# Patient Record
(demographics unavailable — no encounter records)

---

## 2024-11-25 NOTE — DISCUSSION/SUMMARY
[PVCs] : ectopic ventricular beats [Hypertension] : hypertension [Stable] : stable [Low Sodium Diet] : low sodium diet [de-identified] : cardiac rehab [Responding to Treatment] : responding to treatment [None] : There are no changes in medication management [Minutes Spent: ___] : for [unfilled] ~Uminutes [de-identified] : f/u ep visit, address anticoag need [de-identified] : s/p cabg [de-identified] : pt admits recent hi salt [de-identified] : consider arb if remains hi [de-identified] : s/p MVR [de-identified] : eliquis bid per surgery, abx prophylaxis for dental work [FreeTextEntry2] : reviewed prior tests, discussed many issues

## 2024-11-25 NOTE — REASON FOR VISIT
[FreeTextEntry1] : Patient with positive cardiac risk factors for CAD presents for f/u evaluation. pt went to [facility] 12/30, had syncope, had MI, then tx to [facility], had cabg/MVR/ DELORES clip with Dr Fred cage, then [facility], then went back to hosp for thoracentesis, now home with visiting PT/ RN. pt feels better now, doing cardiac rehab, no cardiac sx, has occas pvcs on rehab tele pt anxious now about holidays, no cardiac sx

## 2024-11-25 NOTE — REASON FOR VISIT
[FreeTextEntry1] : Patient with positive cardiac risk factors for CAD presents for f/u evaluation. pt went to [facility] 12/30, had syncope, had MI, then tx to [facility], had cabg/MVR/ DELORES clip with Dr Fred cage, then [facility], then went back to hosp for thoracentesis, now home with visiting PT/ RN. pt feels better now, doing cardiac rehab, no cardiac sx, has occas pvcs on rehab tele pt anxious now about holidays, no cardiac sx

## 2024-11-25 NOTE — DISCUSSION/SUMMARY
[PVCs] : ectopic ventricular beats [Hypertension] : hypertension [Stable] : stable [Low Sodium Diet] : low sodium diet [de-identified] : cardiac rehab [Responding to Treatment] : responding to treatment [None] : There are no changes in medication management [Minutes Spent: ___] : for [unfilled] ~Uminutes [de-identified] : f/u ep visit, address anticoag need [de-identified] : s/p cabg [de-identified] : pt admits recent hi salt [de-identified] : consider arb if remains hi [de-identified] : s/p MVR [de-identified] : eliquis bid per surgery, abx prophylaxis for dental work [FreeTextEntry2] : reviewed prior tests, discussed many issues

## 2024-12-05 NOTE — PHYSICAL EXAM
[No JVD] : no jugular venous distention [Regular Rhythm] : with a regular rhythm [Normal S1, S2] : normal S1 and S2 [Normal] : no CVA or spinal tenderness [No Joint Swelling] : no joint swelling [Grossly Normal Strength/Tone] : grossly normal strength/tone [None] : no ulcers in either foot were found [TWNoteComboBox3] : +2 [TWNoteComboBox4] : +2

## 2024-12-05 NOTE — HISTORY OF PRESENT ILLNESS
[Coronary Artery Disease] : Coronary Artery Disease [Diabetes Mellitus] : Diabetes Mellitus [Hyperlipidemia] : Hyperlipidemia [Hypertension] : Hypertension [Other: ___] : [unfilled] [No episodes] : No hypoglycemic episodes since the last visit. [Fasting:  ___] : Fasting Blood Sugar: [unfilled] mg/dL [Most Recent A1C: ___] : Most recent A1C was [unfilled] [Does not check BP] : The patient is not checking blood pressure [120/80] : Target blood pressure is 120/80 [Stable] : Patient is stable [FreeTextEntry6] : 78y/o well controlled DM2 HTN HLD CAD f/u cardiology, h/o breast ca here for f/u medical condtions and notes is f/u getting FS and weighting self. 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  Pt notes cardiology is only responsible for heart meds and needs other pills   Blood Sugar 92, 107, 120, 111 Weight 112, 110 [EyeExamDate] : 12/4/2024

## 2024-12-05 NOTE — ASSESSMENT
[FreeTextEntry1] : Pt education done and pt took notes of recommendations given and advised to restart cream bilateral feet. A picture was taken of her feet for pt to see on her own phone as she was not able to see it. Pt's questions answered verbalizing understanding   I am providing continuous care that includes testing, discussion of treatment options and shared decision making on diagnosis chosen treatment.

## 2024-12-05 NOTE — REVIEW OF SYSTEMS
[Hearing Loss] : hearing loss [FreeTextEntry2] : forgetful [de-identified] : right foot pain after stepped on toy

## 2024-12-05 NOTE — REVIEW OF SYSTEMS
[Hearing Loss] : hearing loss [FreeTextEntry2] : forgetful [de-identified] : right foot pain after stepped on toy

## 2024-12-16 NOTE — REASON FOR VISIT
[FreeTextEntry1] : Patient with positive cardiac risk factors for CAD presents for f/u evaluation. pt went [facility], had syncope, had MI, then tx to [facility], had cabg/MVR/ DELORES clip with Dr Fred cage, then [facility], then went back to hosp for thoracentesis, now home with visiting PT/ RN. pt feels better now, doing cardiac rehab, no cardiac sx, has occas pvcs on rehab tele pt anxious now about holidays, pt claims had a brief atypical sscp a few weeks ago, not re-occured

## 2024-12-16 NOTE — REASON FOR VISIT
[FreeTextEntry1] : Patient with positive cardiac risk factors for CAD presents for f/u evaluation. [place] [facility] [place], had syncope, had MI, then tx to [facility], had cabg/MVR/ DELORES clip with Dr Fred cage, then [facility], then went back to hosp for thoracentesis, now home with visiting PT/ RN. pt feels better now, doing cardiac rehab, no cardiac sx, has occas pvcs on rehab tele pt anxious now about holidays, pt claims had a brief atypical sscp a few weeks ago, not re-occured

## 2024-12-16 NOTE — DISCUSSION/SUMMARY
[PVCs] : ectopic ventricular beats [Hypertension] : hypertension [Stable] : stable [Responding to Treatment] : responding to treatment [Low Sodium Diet] : low sodium diet [None] : There are no changes in medication management [Echocardiogram] : an echocardiogram [de-identified] : f/u ep visit, address anticoag need [de-identified] : s/p cabg [de-identified] : pt admits recent hi salt [de-identified] : consider arb if remains hi [de-identified] : s/p MVR [de-identified] : eliquis bid per surgery, abx prophylaxis for dental work

## 2024-12-16 NOTE — DISCUSSION/SUMMARY
[PVCs] : ectopic ventricular beats [Hypertension] : hypertension [Stable] : stable [Responding to Treatment] : responding to treatment [Low Sodium Diet] : low sodium diet [None] : There are no changes in medication management [Echocardiogram] : an echocardiogram [de-identified] : f/u ep visit, address anticoag need [de-identified] : s/p cabg [de-identified] : pt admits recent hi salt [de-identified] : consider arb if remains hi [de-identified] : s/p MVR [de-identified] : eliquis bid per surgery, abx prophylaxis for dental work

## 2024-12-18 NOTE — CONSULT LETTER
[Dear  ___] : Dear  [unfilled], [Courtesy Letter:] : I had the pleasure of seeing your patient, [unfilled], in my office today. [Please see my note below.] : Please see my note below. [Consult Closing:] : Thank you very much for allowing me to participate in the care of this patient.  If you have any questions, please do not hesitate to contact me. [Sincerely,] : Sincerely, [FreeTextEntry3] : Elly James MD

## 2024-12-18 NOTE — HISTORY OF PRESENT ILLNESS
[Disease: _____________________] : Disease: [unfilled] [T: ___] : T[unfilled] [N: ___] : N[unfilled] [M: ___] : M[unfilled] [AJCC Stage: ____] : AJCC Stage: [unfilled] [de-identified] : Patient with history of left breast cancer 10/2013-stage IA (pT1cN0), ER positive, [place] positive, HER-2 negative. Oncotype DX score of 35 placed patient in high risk category. Status post left breast conservation surgery--> Taxotere/Cytoxan-->RT--> Arimidex 6/2014-6/2021. Received biannual Zometa infusions (Zometa discontinued 2019).\par  BRCA 1 and 2 testing negative.\par  \par  11/2013-Leukopenia/thrombocytopenia/macrocytosis.  Bone marrow biopsy and bone marrow aspirate showed erythroid predominant trilineage hematopoiesis with maturation and increased iron stores. No ring sideroblasts seen. Megakaryocytes appeared normal in number with normal morphology. Flow cytometry myeloid immunophenotypic findings showed normal myeloid granularity, no increase in myeloid immaturity and normal myeloid antigen maturation pattern. The lymphocyte immunophenotypic findings showed no diagnostic abnormalities. Cytogenetics showed a normal karyotype.  Negative MDS FISH study.\par  \par   [de-identified] : No new complaints. Notes hearing still poor. No abnormal bruising or bleeding. No recent fever. No cough or SOB. No c/o H/A, palpitations, edema.     [de-identified] : Invasive ductal carcinoma

## 2024-12-18 NOTE — REVIEW OF SYSTEMS
[Negative] : Allergic/Immunologic [Loss of Hearing] : loss of hearing [Fainting] : no fainting [de-identified] : decreased memory

## 2024-12-18 NOTE — PHYSICAL EXAM
Problem: Physical Therapy Goal  Goal: Physical Therapy Goal  Goals to be met by: 14 days     Patient will increase functional independence with mobility by performin. Supine to sit with Stand-by Assistance  2. Sit to supine with Stand-by Assistance4  3. Sit to stand transfer with Stand-by Assistance with RLE brace locked in extension and with left platform rolling walker  4. Bed to chair transfer with Stand-by Assistance  with RLE brace locked in extension and with left platform rolling walker  5. Gait  x 150 feet with Stand-by Assistance  with RLE brace locked in extension and with left platform rolling walker  6. Wheelchair propulsion x50 feet with Stand-by Assistance using bilateral upper extremities  7. Ascend/Descend 4 inch curb step with Contact Guard Assistance  with RLE brace locked in extension and with left platform rolling walker.  8. Lower extremity exercise program x20 reps per handout, with assistance as needed and gym therex     Outcome: Ongoing (interventions implemented as appropriate)  Goals remain appropriate.        [Fully active, able to carry on all pre-disease performance without restriction] : Status 0 - Fully active, able to carry on all pre-disease performance without restriction [Normal] : affect appropriate [de-identified] : no dominant mass, nipple discharge [de-identified] : no gross focal motor extremity deficits; poor memory

## 2024-12-18 NOTE — PHYSICAL EXAM
[Fully active, able to carry on all pre-disease performance without restriction] : Status 0 - Fully active, able to carry on all pre-disease performance without restriction [Normal] : affect appropriate [de-identified] : no dominant mass, nipple discharge [de-identified] : no gross focal motor extremity deficits; poor memory

## 2024-12-18 NOTE — ASSESSMENT
[FreeTextEntry1] : LFTs, calcium, CBC , 12/16/24 cardiology note reviewed. #1) left breast cancer 10/2013-Stage IA (pT1cN0), ER positive, [place] positive, HER-2 negative. Oncotype DX score of 35 placed patient in high risk category. Status post left breast conservation surgery--> Taxotere/Cytoxan-->RT--> Arimidex 6/2014-6/2021.  12/29/2022-Mammogram/breast US-BI-RADS 2 - Benign Finding(s). With flattened left nipple and dry skin changes. 5/25/2023-Breast MRI-5 mm region of enhancement involving the dermis of the lateral aspect of the inverted left nipple for which a surgical or dermatologic follow-up and management is recommended as this may represent Paget's disease in a patient with reported new clinical findings involving the left nipple. 7/2023-s/p left breast /skin biopsy-pathology favoring reactive. --clinically TONI  6/11/2024-Mammogram/breast US-benign [id number] 2.  #2) 11/2013-Leukopenia/thrombocytopenia/macrocytosis.  Bone marrow biopsy and bone marrow aspirate showed erythroid predominant trilineage hematopoiesis with maturation and increased iron stores. No ring sideroblasts seen. Megakaryocytes appeared normal in number with normal morphology. Flow cytometry myeloid immunophenotypic findings showed normal myeloid granularity, no increase in myeloid immaturity and normal myeloid antigen maturation pattern. The lymphocyte immunophenotypic findings showed no diagnostic abnormalities. Cytogenetics showed a normal karyotype.  Negative MDS FISH study. 1/26/2024-Bone marrow biopsy, clot section and aspirate: Marginally adequate hypocellular bone marrow with maturing trilineage hematopoiesis. Diagnostic features of myelodyplastic syndrome or lymphoma are not seen in this limited specimen. Normal karyotype. --hemoglobin and ANC WNL on most recent CBC available, and platelet count adequate --Hematologic surveillance.  --Continue to monitor CBC with differential.  --Should hematologic scenario change/worsen, can consider followup bone marrow evaluation.  #3) to f/u with PCP for glucose/ primary care issues.  Patient was given the opportunity to ask questions. Her questions have been answered to her apparent satisfaction. She is agreeable to recommended followup.   -->RTO 3 months.

## 2024-12-18 NOTE — REVIEW OF SYSTEMS
[Negative] : Allergic/Immunologic [Loss of Hearing] : loss of hearing [Fainting] : no fainting [de-identified] : decreased memory

## 2024-12-18 NOTE — HISTORY OF PRESENT ILLNESS
[Disease: _____________________] : Disease: [unfilled] [T: ___] : T[unfilled] [N: ___] : N[unfilled] [M: ___] : M[unfilled] [AJCC Stage: ____] : AJCC Stage: [unfilled] [de-identified] : Patient with history of left breast cancer 10/2013-stage IA (pT1cN0), ER positive, [place] positive, HER-2 negative. Oncotype DX score of 35 placed patient in high risk category. Status post left breast conservation surgery--> Taxotere/Cytoxan-->RT--> Arimidex 6/2014-6/2021. Received biannual Zometa infusions (Zometa discontinued 2019).\par  BRCA 1 and 2 testing negative.\par  \par  11/2013-Leukopenia/thrombocytopenia/macrocytosis.  Bone marrow biopsy and bone marrow aspirate showed erythroid predominant trilineage hematopoiesis with maturation and increased iron stores. No ring sideroblasts seen. Megakaryocytes appeared normal in number with normal morphology. Flow cytometry myeloid immunophenotypic findings showed normal myeloid granularity, no increase in myeloid immaturity and normal myeloid antigen maturation pattern. The lymphocyte immunophenotypic findings showed no diagnostic abnormalities. Cytogenetics showed a normal karyotype.  Negative MDS FISH study.\par  \par   [de-identified] : Invasive ductal carcinoma [de-identified] : No new complaints. Notes hearing still poor. No abnormal bruising or bleeding. No recent fever. No cough or SOB. No c/o H/A, palpitations, edema.

## 2024-12-18 NOTE — ASSESSMENT
[FreeTextEntry1] : LFTs, calcium, CBC , 12/16/24 cardiology note reviewed. #1) left breast cancer 10/2013-Stage IA (pT1cN0), ER positive, [place] positive, HER-2 negative. Oncotype DX score of 35 placed patient in high risk category. Status post left breast conservation surgery--> Taxotere/Cytoxan-->RT--> Arimidex 6/2014-6/2021.  12/29/2022-Mammogram/breast US-BI-RADS 2 - Benign Finding(s). With flattened left nipple and dry skin changes. 5/25/2023-Breast MRI-5 mm region of enhancement involving the dermis of the lateral aspect of the inverted left nipple for which a surgical or dermatologic follow-up and management is recommended as this may represent Paget's disease in a patient with reported new clinical findings involving the left nipple. 7/2023-s/p left breast /skin biopsy-pathology favoring reactive. --clinically TONI  6/11/2024-Mammogram/breast US-benign [id number] 2.  #2) 11/2013-Leukopenia/thrombocytopenia/macrocytosis.  Bone marrow biopsy and bone marrow aspirate showed erythroid predominant trilineage hematopoiesis with maturation and increased iron stores. No ring sideroblasts seen. Megakaryocytes appeared normal in number with normal morphology. Flow cytometry myeloid immunophenotypic findings showed normal myeloid granularity, no increase in myeloid immaturity and normal myeloid antigen maturation pattern. The lymphocyte immunophenotypic findings showed no diagnostic abnormalities. Cytogenetics showed a normal karyotype.  Negative MDS FISH study. 1/26/2024-Bone marrow biopsy, clot section and aspirate: Marginally adequate hypocellular bone marrow with maturing trilineage hematopoiesis. Diagnostic features of myelodyplastic syndrome or lymphoma are not seen in this limited specimen. Normal karyotype. --hemoglobin and ANC WNL on most recent CBC available, and platelet count adequate --Hematologic surveillance.  --Continue to monitor CBC with differential.  --Should hematologic scenario change/worsen, can consider followup bone marrow evaluation.  #3) to f/u with PCP for glucose/ primary care issues.  Patient was given the opportunity to ask questions. Her questions have been answered to her apparent satisfaction. She is agreeable to recommended followup.   -->RTO 3 months.

## 2024-12-18 NOTE — HISTORY OF PRESENT ILLNESS
[Disease: _____________________] : Disease: [unfilled] [T: ___] : T[unfilled] [N: ___] : N[unfilled] [M: ___] : M[unfilled] [AJCC Stage: ____] : AJCC Stage: [unfilled] [de-identified] : Patient with history of left breast cancer 10/2013-stage IA (pT1cN0), ER positive, [place] positive, HER-2 negative. Oncotype DX score of 35 placed patient in high risk category. Status post left breast conservation surgery--> Taxotere/Cytoxan-->RT--> Arimidex 6/2014-6/2021. Received biannual Zometa infusions (Zometa discontinued 2019).\par  BRCA 1 and 2 testing negative.\par  \par  11/2013-Leukopenia/thrombocytopenia/macrocytosis.  Bone marrow biopsy and bone marrow aspirate showed erythroid predominant trilineage hematopoiesis with maturation and increased iron stores. No ring sideroblasts seen. Megakaryocytes appeared normal in number with normal morphology. Flow cytometry myeloid immunophenotypic findings showed normal myeloid granularity, no increase in myeloid immaturity and normal myeloid antigen maturation pattern. The lymphocyte immunophenotypic findings showed no diagnostic abnormalities. Cytogenetics showed a normal karyotype.  Negative MDS FISH study.\par  \par   [de-identified] : Invasive ductal carcinoma [de-identified] : No new complaints. Notes hearing still poor. No abnormal bruising or bleeding. No recent fever. No cough or SOB. No c/o H/A, palpitations, edema.

## 2024-12-18 NOTE — REVIEW OF SYSTEMS
[Negative] : Allergic/Immunologic [Loss of Hearing] : loss of hearing [Fainting] : no fainting [de-identified] : decreased memory

## 2024-12-18 NOTE — ASSESSMENT
[FreeTextEntry1] : LFTs, calcium, CBC , 12/16/24 cardiology note reviewed. #1) left breast cancer 10/2013-Stage IA (pT1cN0), ER positive, [place] positive, HER-2 negative. Oncotype DX score of 35 placed patient in high risk category. Status post left breast conservation surgery--> Taxotere/Cytoxan-->RT--> Arimidex 6/2014-6/2021.  12/29/2022-Mammogram/breast US-BI-RADS 2 - Benign Finding(s). With flattened left nipple and dry skin changes. 5/25/2023-Breast MRI-5 mm region of enhancement involving the dermis of the lateral aspect of the inverted left nipple for which a surgical or dermatologic follow-up and management is recommended as this may represent Paget's disease in a patient with reported new clinical findings involving the left nipple. 7/2023-s/p left breast /skin biopsy-pathology favoring reactive. --clinically TONI  6/11/2024-Mammogram/breast US-benign [id number] 2.  #2) 11/2013-Leukopenia/thrombocytopenia/macrocytosis.  Bone marrow biopsy and bone marrow aspirate showed erythroid predominant trilineage hematopoiesis with maturation and increased iron stores. No ring sideroblasts seen. Megakaryocytes appeared normal in number with normal morphology. Flow cytometry myeloid immunophenotypic findings showed normal myeloid granularity, no increase in myeloid immaturity and normal myeloid antigen maturation pattern. The lymphocyte immunophenotypic findings showed no diagnostic abnormalities. Cytogenetics showed a normal karyotype.  Negative MDS FISH study. 1/26/2024-Bone marrow biopsy, clot section and aspirate: Marginally adequate hypocellular bone marrow with maturing trilineage hematopoiesis. Diagnostic features of myelodyplastic syndrome or lymphoma are not seen in this limited specimen. Normal karyotype. --hemoglobin and ANC WNL on most recent CBC available, and platelet count adequate --Hematologic surveillance.  --Continue to monitor CBC with differential.  --Should hematologic scenario change/worsen, can consider followup bone marrow evaluation.  #3) to f/u with PCP for glucose/ primary care issues.  Patient was given the opportunity to ask questions. Her questions have been answered to her apparent satisfaction. She is agreeable to recommended followup.   -->RTO 3 months.

## 2025-02-05 NOTE — HISTORY OF PRESENT ILLNESS
[FreeTextEntry1] : Problems: 1. DM type 2 2. Hypertension 3. Hyperlipidemia 4. DM nephropathy (normal creatinine, positive urine microalbumin panel in May 2021 but negative in May 2024)  DM type 2 1. Patient does not remember when she was diagnosed with DM 2. Meds: Farxiga 10 mg po daily - no UTIs, no frequent genital candidiasis but did appear to have a vaginal candida infection in May 2024 that resolved with Monistat.  Patient had Cardiogenic shock in the past.   3. Fingersticks done three times per week - 90s to 110s, no hypoglycemic unawareness 4. On Aspirin 81 mg po daily, not on statin - I PRESCRIBED rosuvastatin 40 mg po daily ON 02/05/2025, not ACEi/ARB - I PRESCRIBED losartan 12.5 mg po daily ON 02/05/2025 5. Complications: Has DM nephropathy (normal creatinine, positive urine microalbumin panel in May 2021 but negative in May 2024) No DM retinopathy (last eye exam was in Feb 2024, patient advised on the need for annual DM eye exam) Has CAD s/p CABG and mitral valve replacement - patient also has heart failure, No other ASCVD No foot ulcers/amputation 6. Patient never smoked cigarettes

## 2025-02-05 NOTE — PHYSICAL EXAM
[de-identified] : General: No distress, well nourished Eyes: Normal Sclera, EOMI, PERRL ENT: Normal appearance of the nose, normal oropharynx Neck/Thyroid: No cervical lymphadenopathy, thyroid gland 20 g in size, no thyroid nodules, non-tender Respiratory: No use of accessory muscles of respiration, vesicular breath sounds heard bilaterally, no crepitations or ronchi Cardiovascular: S1 and S2 heard and normal, no S3 or S4, no murmurs, radial pulse normal bilaterally Abdomen: soft, non-tender, no masses, normal bowel sounds Musculoskeletal: No swelling or deformities of joints of hands, no pedal edema Neurological: Normal range of motion in the hands, Normal brachioradialis reflexes bilaterally Psychiatry: Patient converses normally, good judgement and insight Skin: No rashes in hands, no nodules palpated in hands  [de-identified] : DM foot exam done on 02/05/2025: No ulcers seen in feet, has calluses in feet Dorsalis pedis pulses normal bilaterally Sensation to 10 g monofilament normal in feet bilaterally

## 2025-02-05 NOTE — ASSESSMENT
[FreeTextEntry1] : Targets in patients 65 years and older: HbA1c 7 to 8%, BP < 140/90  HbA1c was below goal but I will accept this.   Patient is on Aspirin - I PRESCRIBED STATIN AND ARB ON 02/05/2025  Last lipid panel - Jan 2025 - Trig 105,  Last HbA1c - 12/13/2024 - 6.5% Last Vitamin B12 - N/A Last urine albumin panel - May 2024 - neg, May 2021 - positive Last BMP/CMP - Jan 2025 - Cr N, K N, AST N, ALT N  Plan: 1. Continue Farxiga 10 mg po daily 2. Start rosuvastatin 40 mg po daily 3. Start losartan 12.5 mg po daily 4. Fingersticks to be done once daily 5. Labs to be done in 3 months - see below 6. Follow up in 3 months to review labs and meter.

## 2025-03-03 NOTE — CARDIOLOGY SUMMARY
[de-identified] : 2/26/25 sinus alison with competing low RA rhythm  6/24/24: Sinus rhythm at 66 bpm [id number]  [de-identified] : 5/23/24: 1. Technically difficult image quality. 2. Left ventricular cavity is normal in size. Left ventricular wall thickness is normal. Left ventricular systolic function is mildly decreased with an ejection fraction visually estimated at 45 to 50 %. 3. Analysis of left ventricular diastolic function and filling pressure is made challenging by the presence of prosthetic mitral valve. 4. Aortic root at the sinuses of Valsalva is normal in size, measuring 3.23 cm (indexed 2.09 cm/m). 5. Trileaflet aortic valve. Fibrocalcific aortic valve sclerosis without stenosis. 6. Mild aortic regurgitation. 7. Bioprosthetic valve present.Christie valve replacement in the mitral position. 8. The left atrium is mildly dilated. 9. The right atrium is normal in size. 10. The interatrial septum appears intact. 11. Normal right ventricular cavity size and normal wall thickness,Tricuspid annular plane systolic excursion (TAPSE) is 1.8 cm (normal >=1.7 cm). 12. Structurally normal tricuspid valve with normal leaflet excursion. Mild tricuspid regurgitation. 13. Estimated pulmonary artery systolic pressure is 34 mmHg. 14. Pulmonic valve was not well visualized. 15. No pericardial effusion seen.

## 2025-03-03 NOTE — CARDIOLOGY SUMMARY
[de-identified] : 2/26/25 sinus alison with competing low RA rhythm  6/24/24: Sinus rhythm at 66 bpm [id number]  [de-identified] : 5/23/24: 1. Technically difficult image quality. 2. Left ventricular cavity is normal in size. Left ventricular wall thickness is normal. Left ventricular systolic function is mildly decreased with an ejection fraction visually estimated at 45 to 50 %. 3. Analysis of left ventricular diastolic function and filling pressure is made challenging by the presence of prosthetic mitral valve. 4. Aortic root at the sinuses of Valsalva is normal in size, measuring 3.23 cm (indexed 2.09 cm/m). 5. Trileaflet aortic valve. Fibrocalcific aortic valve sclerosis without stenosis. 6. Mild aortic regurgitation. 7. Bioprosthetic valve present.Christie valve replacement in the mitral position. 8. The left atrium is mildly dilated. 9. The right atrium is normal in size. 10. The interatrial septum appears intact. 11. Normal right ventricular cavity size and normal wall thickness,Tricuspid annular plane systolic excursion (TAPSE) is 1.8 cm (normal >=1.7 cm). 12. Structurally normal tricuspid valve with normal leaflet excursion. Mild tricuspid regurgitation. 13. Estimated pulmonary artery systolic pressure is 34 mmHg. 14. Pulmonic valve was not well visualized. 15. No pericardial effusion seen.

## 2025-03-03 NOTE — END OF VISIT
[FreeTextEntry3] :  I, Dr. Benson Bates, personally performed the evaluation and management (E/M) services for this established patient who presents today with (a) new problem(s)/exacerbation of (an) existing condition(s). That E/M includes conducting the examination, assessing all new/exacerbated conditions, and establishing a new plan of care. Today my fellow, Dr Jeremi Bowen, was here to observe my evaluation and management services for this new problem/exacerbated condition to be followed going forward.  [Time Spent: ___ minutes] : I have spent [unfilled] minutes of time on the encounter which excludes teaching and separately reported services.

## 2025-03-03 NOTE — DISCUSSION/SUMMARY
[FreeTextEntry1] : In summary this is a 77-year-old woman status post MVR/CABG/DELORES clip.  She has intermittent ectopic atrial rhythm and is asymptomatic. This is a benign finding and, in the absence of symptoms, does not require further work up or intervention. She has no clear indication for apixaban and will discontinue this medication at this time. She appeared to understand the whole discussion and verbalized that all of her questions were answered to her satisfaction. [EKG obtained to assist in diagnosis and management of assessed problem(s)] : EKG obtained to assist in diagnosis and management of assessed problem(s)

## 2025-03-03 NOTE — HISTORY OF PRESENT ILLNESS
[FreeTextEntry1] :  77-year-old woman with a past medical history of hypertension, hyperlipidemia, diabetes and mitral valve disease status post MVR/CABG/DELORES clip on 1/12/2024.  Postoperative course was complicated by HIT positive and GI bleed which was conservatively managed, and readmission for a loculated left pleural effusion and right middle lobe collapse requiring pleurocentesis.  She was seen for routine follow-up in cardiology clinic in April with ECG reading a junctional rhythm.  Review of this ECG shows ectopic atrial rhythm from low in the atrium with a normal rate. One week event monitor (4/24) was unrevealing with 9 symptom triggers for sinus rhythm.   She denies any incidence of chest pain, shortness of breath, palpitations, dizziness, edema, or syncope. She now presents for follow up. She has been doing well since her last appointment without cardiopulmonary symptoms. Her EKG today reveals sinus bradycardia with competing low right atrial rhythm.

## 2025-03-20 NOTE — REVIEW OF SYSTEMS
[Loss of Hearing] : loss of hearing [Negative] : Allergic/Immunologic [Fainting] : no fainting [de-identified] : decreased memory

## 2025-03-20 NOTE — ASSESSMENT
[FreeTextEntry1] : Lab work reviewed. #1) left breast cancer 10/2013-Stage IA (pT1cN0), ER positive, NE positive, HER-2 negative. Oncotype DX score of 35 placed patient in high risk category. Status post left breast conservation surgery--> Taxotere/Cytoxan-->RT--> Arimidex 6/2014-6/2021.  12/29/2022-Mammogram/breast US-BI-RADS 2 - Benign Finding(s). With flattened left nipple and dry skin changes. 5/25/2023-Breast MRI-5 mm region of enhancement involving the dermis of the lateral aspect of the inverted left nipple for which a surgical or dermatologic follow-up and management is recommended as this may represent Paget's disease in a patient with reported new clinical findings involving the left nipple. 7/2023-s/p left breast /skin biopsy-pathology favoring reactive. --clinically TONI  6/11/2024-Mammogram/breast US-benign [id number] 2. --next breast imaging ordered for 6/2025  #2) 11/2013-Leukopenia/thrombocytopenia/macrocytosis.  Bone marrow biopsy and bone marrow aspirate showed erythroid predominant trilineage hematopoiesis with maturation and increased iron stores. No ring sideroblasts seen. Megakaryocytes appeared normal in number with normal morphology. Flow cytometry myeloid immunophenotypic findings showed normal myeloid granularity, no increase in myeloid immaturity and normal myeloid antigen maturation pattern. The lymphocyte immunophenotypic findings showed no diagnostic abnormalities. Cytogenetics showed a normal karyotype.  Negative MDS FISH study. 1/26/2024-Bone marrow biopsy, clot section and aspirate: Marginally adequate hypocellular bone marrow with maturing trilineage hematopoiesis. Diagnostic features of myelodyplastic syndrome or lymphoma are not seen in this limited specimen. Normal karyotype. --hemoglobin and ANC WNL on most recent CBC available, and platelet count adequate 3/17/2025. --Hematologic surveillance.  --Continue to monitor CBC with differential.  --Should hematologic scenario change/worsen, can consider followup bone marrow evaluation.  #3) to f/u with PCP for glucose/ primary care issues.  Patient was given the opportunity to ask questions. Her questions have been answered to her apparent satisfaction. She is agreeable to recommended followup.   -->RTO 3 months.

## 2025-03-20 NOTE — PHYSICAL EXAM
[Fully active, able to carry on all pre-disease performance without restriction] : Status 0 - Fully active, able to carry on all pre-disease performance without restriction [Normal] : affect appropriate [de-identified] : no dominant mass, nipple discharge [de-identified] : no gross focal motor extremity deficits; poor memory

## 2025-03-20 NOTE — REVIEW OF SYSTEMS
[Loss of Hearing] : loss of hearing [Negative] : Allergic/Immunologic [Fainting] : no fainting [de-identified] : decreased memory

## 2025-03-20 NOTE — PHYSICAL EXAM
[Fully active, able to carry on all pre-disease performance without restriction] : Status 0 - Fully active, able to carry on all pre-disease performance without restriction [Normal] : affect appropriate [de-identified] : no dominant mass, nipple discharge [de-identified] : no gross focal motor extremity deficits; poor memory

## 2025-03-20 NOTE — ASSESSMENT
[FreeTextEntry1] : Lab work reviewed. #1) left breast cancer 10/2013-Stage IA (pT1cN0), ER positive, NE positive, HER-2 negative. Oncotype DX score of 35 placed patient in high risk category. Status post left breast conservation surgery--> Taxotere/Cytoxan-->RT--> Arimidex 6/2014-6/2021.  12/29/2022-Mammogram/breast US-BI-RADS 2 - Benign Finding(s). With flattened left nipple and dry skin changes. 5/25/2023-Breast MRI-5 mm region of enhancement involving the dermis of the lateral aspect of the inverted left nipple for which a surgical or dermatologic follow-up and management is recommended as this may represent Paget's disease in a patient with reported new clinical findings involving the left nipple. 7/2023-s/p left breast /skin biopsy-pathology favoring reactive. --clinically TONI  6/11/2024-Mammogram/breast US-benign [id number] 2. --next breast imaging ordered for 6/2025  #2) 11/2013-Leukopenia/thrombocytopenia/macrocytosis.  Bone marrow biopsy and bone marrow aspirate showed erythroid predominant trilineage hematopoiesis with maturation and increased iron stores. No ring sideroblasts seen. Megakaryocytes appeared normal in number with normal morphology. Flow cytometry myeloid immunophenotypic findings showed normal myeloid granularity, no increase in myeloid immaturity and normal myeloid antigen maturation pattern. The lymphocyte immunophenotypic findings showed no diagnostic abnormalities. Cytogenetics showed a normal karyotype.  Negative MDS FISH study. 1/26/2024-Bone marrow biopsy, clot section and aspirate: Marginally adequate hypocellular bone marrow with maturing trilineage hematopoiesis. Diagnostic features of myelodyplastic syndrome or lymphoma are not seen in this limited specimen. Normal karyotype. --hemoglobin and ANC WNL on most recent CBC available, and platelet count adequate 3/17/2025. --Hematologic surveillance.  --Continue to monitor CBC with differential.  --Should hematologic scenario change/worsen, can consider followup bone marrow evaluation.  #3) to f/u with PCP for glucose/ primary care issues.  Patient was given the opportunity to ask questions. Her questions have been answered to her apparent satisfaction. She is agreeable to recommended followup.   -->RTO 3 months.

## 2025-03-20 NOTE — HISTORY OF PRESENT ILLNESS
[Disease: _____________________] : Disease: [unfilled] [T: ___] : T[unfilled] [N: ___] : N[unfilled] [M: ___] : M[unfilled] [AJCC Stage: ____] : AJCC Stage: [unfilled] [de-identified] : Patient with history of left breast cancer 10/2013-stage IA (pT1cN0), ER positive, WA positive, HER-2 negative. Oncotype DX score of 35 placed patient in high risk category. Status post left breast conservation surgery--> Taxotere/Cytoxan-->RT--> Arimidex 6/2014-6/2021. Received biannual Zometa infusions (Zometa discontinued 2019).\par  BRCA 1 and 2 testing negative.\par  \par  11/2013-Leukopenia/thrombocytopenia/macrocytosis.  Bone marrow biopsy and bone marrow aspirate showed erythroid predominant trilineage hematopoiesis with maturation and increased iron stores. No ring sideroblasts seen. Megakaryocytes appeared normal in number with normal morphology. Flow cytometry myeloid immunophenotypic findings showed normal myeloid granularity, no increase in myeloid immaturity and normal myeloid antigen maturation pattern. The lymphocyte immunophenotypic findings showed no diagnostic abnormalities. Cytogenetics showed a normal karyotype.  Negative MDS FISH study.\par  \par   [de-identified] : Invasive ductal carcinoma [de-identified] : Generally feels well. Notes hearing still poor. No abnormal bruising or bleeding. No cough or SOB. No c/o H/A, palpitations, edema. Started amoxicillin for UTI. No fevers reported.

## 2025-03-20 NOTE — HISTORY OF PRESENT ILLNESS
[Disease: _____________________] : Disease: [unfilled] [T: ___] : T[unfilled] [N: ___] : N[unfilled] [M: ___] : M[unfilled] [AJCC Stage: ____] : AJCC Stage: [unfilled] [de-identified] : Patient with history of left breast cancer 10/2013-stage IA (pT1cN0), ER positive, [place] positive, HER-2 negative. Oncotype DX score of 35 placed patient in high risk category. Status post left breast conservation surgery--> Taxotere/Cytoxan-->RT--> Arimidex 6/2014-6/2021. Received biannual Zometa infusions (Zometa discontinued 2019).\par  BRCA 1 and 2 testing negative.\par  \par  11/2013-Leukopenia/thrombocytopenia/macrocytosis.  Bone marrow biopsy and bone marrow aspirate showed erythroid predominant trilineage hematopoiesis with maturation and increased iron stores. No ring sideroblasts seen. Megakaryocytes appeared normal in number with normal morphology. Flow cytometry myeloid immunophenotypic findings showed normal myeloid granularity, no increase in myeloid immaturity and normal myeloid antigen maturation pattern. The lymphocyte immunophenotypic findings showed no diagnostic abnormalities. Cytogenetics showed a normal karyotype.  Negative MDS FISH study.\par  \par   [de-identified] : Invasive ductal carcinoma [de-identified] : Generally feels well. Notes hearing still poor. No abnormal bruising or bleeding. No cough or SOB. No c/o H/A, palpitations, edema. Started amoxicillin for UTI. No fevers reported.

## 2025-04-14 NOTE — DISCUSSION/SUMMARY
[PVCs] : ectopic ventricular beats [Echocardiogram] : an echocardiogram [Hypertension] : hypertension [Stable] : stable [Responding to Treatment] : responding to treatment [Low Sodium Diet] : low sodium diet [None] : There are no changes in medication management [Minutes Spent: ___] : for [unfilled] ~Uminutes [de-identified] : see ep visit, now off  anticoag  [de-identified] : s/p cabg [de-identified] : pt admits recent hi salt [de-identified] : consider arb if remains hi [de-identified] : s/p MVR [de-identified] : abx prophylaxis for dental work [FreeTextEntry2] : reviewed eps eval with pt

## 2025-04-14 NOTE — REASON FOR VISIT
[FreeTextEntry1] : Patient with positive cardiac risk factors for CAD presents for f/u evaluation. pt went to [facility] [place], had syncope, had MI, then tx to [facility], had cabg/MVR/ DELORES clip with Dr Fred cage, then [facility], then went back to hosp for thoracentesis, now home with visiting PT/ RN. pt feels better now,s/p cardiac rehab, no cardiac sx, had occas pvcs on rehab tele, saw eps dr chris, felt pt stable

## 2025-04-14 NOTE — DISCUSSION/SUMMARY
[PVCs] : ectopic ventricular beats [Echocardiogram] : an echocardiogram [Hypertension] : hypertension [Stable] : stable [Responding to Treatment] : responding to treatment [Low Sodium Diet] : low sodium diet [None] : There are no changes in medication management [Minutes Spent: ___] : for [unfilled] ~Uminutes [de-identified] : see ep visit, now off  anticoag  [de-identified] : s/p cabg [de-identified] : pt admits recent hi salt [de-identified] : consider arb if remains hi [de-identified] : s/p MVR [de-identified] : abx prophylaxis for dental work [FreeTextEntry2] : reviewed eps eval with pt

## 2025-04-14 NOTE — REASON FOR VISIT
[FreeTextEntry1] : Patient with positive cardiac risk factors for CAD presents for f/u evaluation. pt went to [facility] [place] [facility] 12/30, had syncope, had MI, then tx to [facility], had cabg/MVR/ DELORES clip with Dr Fred cage, then [facility], then went back to hosp for thoracentesis, now home with visiting PT/ RN. pt feels better now,s/p cardiac rehab, no cardiac sx, had occas pvcs on rehab tele, saw eps dr chris, felt pt stable

## 2025-04-14 NOTE — REASON FOR VISIT
[FreeTextEntry1] : Patient with positive cardiac risk factors for CAD presents for f/u evaluation. pt went to [facility] [place], had syncope, had MI, then tx to [facility], had cabg/MVR/ DELORES clip with Dr Fred cage, then [facility], then went back to hosp for thoracentesis, now home with visiting PT/ RN. pt feels better now,s/p cardiac rehab, no cardiac sx, had occas pvcs on rehab tele, saw eps dr chris, felt pt stable

## 2025-04-14 NOTE — DISCUSSION/SUMMARY
[PVCs] : ectopic ventricular beats [Echocardiogram] : an echocardiogram [Hypertension] : hypertension [Stable] : stable [Responding to Treatment] : responding to treatment [Low Sodium Diet] : low sodium diet [None] : There are no changes in medication management [Minutes Spent: ___] : for [unfilled] ~Uminutes [de-identified] : see ep visit, now off  anticoag  [de-identified] : s/p cabg [de-identified] : pt admits recent hi salt [de-identified] : consider arb if remains hi [de-identified] : s/p MVR [de-identified] : abx prophylaxis for dental work [FreeTextEntry2] : reviewed eps eval with pt

## 2025-05-05 NOTE — HISTORY OF PRESENT ILLNESS
[FreeTextEntry1] : Problems: 1. DM type 2 2. Hypertension 3. Hyperlipidemia 4. DM nephropathy (normal creatinine, positive urine microalbumin panel in May 2021 but negative in May 2024)  DM type 2 1. Patient does not remember when she was diagnosed with DM 2. Meds: Farxiga 10 mg po daily - no UTIs, no frequent genital candidiasis but did appear to have a vaginal candida infection in May 2024 that resolved with Monistat.  Patient had Cardiogenic shock in the past.   3. Fingersticks done three times per week - 90s, no hypoglycemic unawareness 4. On Aspirin 81 mg po daily, on rosuvastatin 40 mg po daily, on losartan 12.5 mg po daily  5. Complications: Has DM nephropathy (normal creatinine, positive urine microalbumin panel in May 2021 but negative in May 2024) No DM retinopathy (last eye exam was in 2025, patient advised on the need for annual DM eye exam) Has CAD s/p CABG and mitral valve replacement - patient also has heart failure, No other ASCVD No foot ulcers/amputation 6. Patient never smoked cigarettes

## 2025-05-05 NOTE — PHYSICAL EXAM
[de-identified] : General: No distress, well nourished Eyes: Normal Sclera, EOMI, PERRL ENT: Normal appearance of the nose, normal oropharynx Neck/Thyroid: No cervical lymphadenopathy, thyroid gland 20 g in size, no thyroid nodules, non-tender Respiratory: No use of accessory muscles of respiration, vesicular breath sounds heard bilaterally, no crepitations or ronchi Cardiovascular: S1 and S2 heard and normal, no S3 or S4, no murmurs, radial pulse normal bilaterally Abdomen: soft, non-tender, no masses, normal bowel sounds Musculoskeletal: No swelling or deformities of joints of hands, no pedal edema Neurological: Normal range of motion in the hands, Normal brachioradialis reflexes bilaterally Psychiatry: Patient converses normally, good judgement and insight Skin: No rashes in hands, no nodules palpated in hands  [de-identified] : DM foot exam done on 02/05/2025: No ulcers seen in feet, has calluses in feet Dorsalis pedis pulses normal bilaterally Sensation to 10 g monofilament normal in feet bilaterally

## 2025-05-08 NOTE — PHYSICAL EXAM
[Normal] : no acute distress, well nourished, well developed and well-appearing [No JVD] : no jugular venous distention [Regular Rhythm] : with a regular rhythm [Normal S1, S2] : normal S1 and S2 [Soft] : abdomen soft [No CVA Tenderness] : no CVA  tenderness [No Spinal Tenderness] : no spinal tenderness [No Joint Swelling] : no joint swelling [Grossly Normal Strength/Tone] : grossly normal strength/tone [No Rash] : no rash

## 2025-05-08 NOTE — HISTORY OF PRESENT ILLNESS
[Coronary Artery Disease] : Coronary Artery Disease [Diabetes Mellitus] : Diabetes Mellitus [Hyperlipidemia] : Hyperlipidemia [Hypertension] : Hypertension [FreeTextEntry6] : 76 y/o here for f/u medical conditions notes is doing well just came back from [place] as pt notes went on vacation and son was upset she was working in the garden. Pt does not like to sit likes to be active and is still working 4 hours a day x 5 days a week. Pt states is doing well denies SE SGLT2 started itching but notes it was because had pads now is fine no UTIs per pt.  pt denies CP SOB leg edema [No episodes] : No hypoglycemic episodes since the last visit. [Understanding of foot care] : Patient expressed understanding of foot care [Most Recent A1C: ___] : Most recent A1C was [unfilled] [EyeExamDate] : 5/5/2025 [Stable] : Patient is stable [High Intensity therapy] : Patient is currently on high intensity statin therapy

## 2025-05-29 NOTE — DISCUSSION/SUMMARY
[PVCs] : ectopic ventricular beats [Echocardiogram] : an echocardiogram [GERD] : gastroesophageal reflux disease [Musculoskeletal Chest Pain] : musculoskeletal chest pain [Costochondritis] : costochondritis [Outpatient Evaluation] : outpatient evaluation [Hypertension] : hypertension [Stable] : stable [Responding to Treatment] : responding to treatment [Low Sodium Diet] : low sodium diet [None] : There are no changes in medication management [Minutes Spent: ___] : for [unfilled] ~Uminutes [de-identified] : pt to monitor sx and inform us if recurrence [de-identified] : s/p cabg [de-identified] : pt admits recent hi salt [de-identified] : consider arb if remains hi [de-identified] : s/p MVR [de-identified] : abx prophylaxis for dental work [FreeTextEntry2] : reviewed eps eval , prior cardiac w/u

## 2025-05-29 NOTE — REASON FOR VISIT
[FreeTextEntry1] : Patient with positive cardiac risk factors for CAD presents for f/u evaluation. pt went to [facility] [place], had syncope, had MI, then tx to [facility], had cabg/MVR/ DELORES clip with Dr Fred cage, then [facility], then went back to hosp for thoracentesis, pt feels better now,s/p cardiac rehab, no cardiac sx, had occas pvcs on rehab tele, saw eps dr chris, felt pt stable pt now c/o some chest pain after going up the hill gardening,heavy yard work , resolved with rest.pt sayswas bending over alot with gardening.

## 2025-06-23 NOTE — PHYSICAL EXAM
[Fully active, able to carry on all pre-disease performance without restriction] : Status 0 - Fully active, able to carry on all pre-disease performance without restriction [Normal] : affect appropriate [de-identified] : no dominant mass, nipple discharge [de-identified] : no gross focal motor extremity deficits; poor memory

## 2025-06-23 NOTE — HISTORY OF PRESENT ILLNESS
[Disease: _____________________] : Disease: [unfilled] [T: ___] : T[unfilled] [N: ___] : N[unfilled] [M: ___] : M[unfilled] [AJCC Stage: ____] : AJCC Stage: [unfilled] [de-identified] : Patient with history of left breast cancer 10/2013-stage IA (pT1cN0), ER positive, MT positive, HER-2 negative. Oncotype DX score of 35 placed patient in high risk category. Status post left breast conservation surgery--> Taxotere/Cytoxan-->RT--> Arimidex 6/2014-6/2021. Received biannual Zometa infusions (Zometa discontinued 2019).\par  BRCA 1 and 2 testing negative.\par  \par  11/2013-Leukopenia/thrombocytopenia/macrocytosis.  Bone marrow biopsy and bone marrow aspirate showed erythroid predominant trilineage hematopoiesis with maturation and increased iron stores. No ring sideroblasts seen. Megakaryocytes appeared normal in number with normal morphology. Flow cytometry myeloid immunophenotypic findings showed normal myeloid granularity, no increase in myeloid immaturity and normal myeloid antigen maturation pattern. The lymphocyte immunophenotypic findings showed no diagnostic abnormalities. Cytogenetics showed a normal karyotype.  Negative MDS FISH study.\par  \par   [de-identified] : Invasive ductal carcinoma [de-identified] : Physically feels overall well. Hearing still poor. No abnormal bruising or bleeding. No cough or SOB. No c/o H/A, palpitations, edema. No fevers reported. Still working.

## 2025-06-23 NOTE — ASSESSMENT
[FreeTextEntry1] : Mammogram/breast US report, Lab work reviewed. # left breast cancer 10/2013-Stage IA (pT1cN0), ER positive, MD positive, HER-2 negative. Oncotype DX score of 35 placed patient in high risk category. Status post left breast conservation surgery--> Taxotere/Cytoxan-->RT--> Arimidex 6/2014-6/2021.  12/29/2022-Mammogram/breast US-BI-RADS 2 - Benign Finding(s). With flattened left nipple and dry skin changes. 5/25/2023-Breast MRI-5 mm region of enhancement involving the dermis of the lateral aspect of the inverted left nipple for which a surgical or dermatologic follow-up and management is recommended as this may represent Paget's disease in a patient with reported new clinical findings involving the left nipple. 7/2023-s/p left breast /skin biopsy-pathology favoring reactive. --clinically TONI  3/20/2025-Mammogram/breast US-benign [id number] 2. 6/12/2025-Mammogram/breast US-BI-RADS 2 - Benign Finding(s)  # 11/2013-Leukopenia/thrombocytopenia/macrocytosis.  Bone marrow biopsy and bone marrow aspirate showed erythroid predominant trilineage hematopoiesis with maturation and increased iron stores. No ring sideroblasts seen. Megakaryocytes appeared normal in number with normal morphology. Flow cytometry myeloid immunophenotypic findings showed normal myeloid granularity, no increase in myeloid immaturity and normal myeloid antigen maturation pattern. The lymphocyte immunophenotypic findings showed no diagnostic abnormalities. Cytogenetics showed a normal karyotype.  Negative MDS FISH study. 1/26/2024-Bone marrow biopsy, clot section and aspirate: Marginally adequate hypocellular bone marrow with maturing trilineage hematopoiesis. Diagnostic features of myelodyplastic syndrome or lymphoma are not seen in this limited specimen. Normal karyotype. --hemoglobin and ANC WNL on most recent CBC available, and platelet count stable 6/16/2025. --surveillance at this time.  --Continue to monitor CBC with differential.  --Should hematologic scenario change/worsen, can consider followup bone marrow evaluation.  # to continue f/u with PCP for glucose/ primary care issues.  Patient was given the opportunity to ask questions. Her questions have been answered to her apparent satisfaction. She is agreeable to recommended followup.   -->RTO 3 months or as clinically indicated.

## 2025-06-23 NOTE — REVIEW OF SYSTEMS
[Loss of Hearing] : loss of hearing [Negative] : Allergic/Immunologic [Fainting] : no fainting [de-identified] : decreased memory

## 2025-06-23 NOTE — HISTORY OF PRESENT ILLNESS
[Disease: _____________________] : Disease: [unfilled] [T: ___] : T[unfilled] [N: ___] : N[unfilled] [M: ___] : M[unfilled] [AJCC Stage: ____] : AJCC Stage: [unfilled] [de-identified] : Patient with history of left breast cancer 10/2013-stage IA (pT1cN0), ER positive, WA positive, HER-2 negative. Oncotype DX score of 35 placed patient in high risk category. Status post left breast conservation surgery--> Taxotere/Cytoxan-->RT--> Arimidex 6/2014-6/2021. Received biannual Zometa infusions (Zometa discontinued 2019).\par  BRCA 1 and 2 testing negative.\par  \par  11/2013-Leukopenia/thrombocytopenia/macrocytosis.  Bone marrow biopsy and bone marrow aspirate showed erythroid predominant trilineage hematopoiesis with maturation and increased iron stores. No ring sideroblasts seen. Megakaryocytes appeared normal in number with normal morphology. Flow cytometry myeloid immunophenotypic findings showed normal myeloid granularity, no increase in myeloid immaturity and normal myeloid antigen maturation pattern. The lymphocyte immunophenotypic findings showed no diagnostic abnormalities. Cytogenetics showed a normal karyotype.  Negative MDS FISH study.\par  \par   [de-identified] : Invasive ductal carcinoma [de-identified] : Physically feels overall well. Hearing still poor. No abnormal bruising or bleeding. No cough or SOB. No c/o H/A, palpitations, edema. No fevers reported. Still working.

## 2025-06-23 NOTE — ASSESSMENT
[FreeTextEntry1] : Mammogram/breast US report, Lab work reviewed. # left breast cancer 10/2013-Stage IA (pT1cN0), ER positive, MT positive, HER-2 negative. Oncotype DX score of 35 placed patient in high risk category. Status post left breast conservation surgery--> Taxotere/Cytoxan-->RT--> Arimidex 6/2014-6/2021.  12/29/2022-Mammogram/breast US-BI-RADS 2 - Benign Finding(s). With flattened left nipple and dry skin changes. 5/25/2023-Breast MRI-5 mm region of enhancement involving the dermis of the lateral aspect of the inverted left nipple for which a surgical or dermatologic follow-up and management is recommended as this may represent Paget's disease in a patient with reported new clinical findings involving the left nipple. 7/2023-s/p left breast /skin biopsy-pathology favoring reactive. --clinically TONI  3/20/2025-Mammogram/breast US-benign [id number] 2. 6/12/2025-Mammogram/breast US-BI-RADS 2 - Benign Finding(s)  # 11/2013-Leukopenia/thrombocytopenia/macrocytosis.  Bone marrow biopsy and bone marrow aspirate showed erythroid predominant trilineage hematopoiesis with maturation and increased iron stores. No ring sideroblasts seen. Megakaryocytes appeared normal in number with normal morphology. Flow cytometry myeloid immunophenotypic findings showed normal myeloid granularity, no increase in myeloid immaturity and normal myeloid antigen maturation pattern. The lymphocyte immunophenotypic findings showed no diagnostic abnormalities. Cytogenetics showed a normal karyotype.  Negative MDS FISH study. 1/26/2024-Bone marrow biopsy, clot section and aspirate: Marginally adequate hypocellular bone marrow with maturing trilineage hematopoiesis. Diagnostic features of myelodyplastic syndrome or lymphoma are not seen in this limited specimen. Normal karyotype. --hemoglobin and ANC WNL on most recent CBC available, and platelet count stable 6/16/2025. --surveillance at this time.  --Continue to monitor CBC with differential.  --Should hematologic scenario change/worsen, can consider followup bone marrow evaluation.  # to continue f/u with PCP for glucose/ primary care issues.  Patient was given the opportunity to ask questions. Her questions have been answered to her apparent satisfaction. She is agreeable to recommended followup.   -->RTO 3 months or as clinically indicated.

## 2025-06-23 NOTE — REVIEW OF SYSTEMS
[Loss of Hearing] : loss of hearing [Negative] : Allergic/Immunologic [Fainting] : no fainting [de-identified] : decreased memory

## 2025-06-23 NOTE — PHYSICAL EXAM
[Fully active, able to carry on all pre-disease performance without restriction] : Status 0 - Fully active, able to carry on all pre-disease performance without restriction [Normal] : affect appropriate [de-identified] : no dominant mass, nipple discharge [de-identified] : no gross focal motor extremity deficits; poor memory

## 2025-07-16 NOTE — PHYSICAL EXAM
[No JVD] : no jugular venous distention [Normal] : normal gait, coordination grossly intact, no focal deficits and deep tendon reflexes were 2+ and symmetric [Normal Insight/Judgement] : insight and judgment were intact [Regular Rhythm] : with a regular rhythm [Normal S1, S2] : normal S1 and S2 [de-identified] : dentures [de-identified] : athrophy of external genitalia [de-identified] : external genitalia irritation and slight swelling of external genitalia  no discharge [de-identified] : tinea pedis with skin desquamation both feet [de-identified] : 2+ pulses biulaterally

## 2025-07-16 NOTE — DATA REVIEWED
[FreeTextEntry1] : went over results from 5/2025; her questions were answered verbalizing understanding no need changes on any medications as well d/w pt

## 2025-07-16 NOTE — HISTORY OF PRESENT ILLNESS
[de-identified] : 79 y/o PMHX DM2 well controlled HTN HLD OP breast ca h/o MI s/p MVR left atrial appendix ligation, here for annual CPE and notes is doing well feeling fine denies CP SOB palpitation or dizziness no leg edema no bowel or bladder issues at times gets vaginal itching no rashes notes is only when gets stress incontinence from holding urine or coughs No rashes per pt and doing well with DM2 medication. Drop heavy object on right big toe and now has hematoma and also on arm gets black and blues easily  No nose bleeding and no gum bleeding Notes had recent eye exam due to [employer]  license

## 2025-07-16 NOTE — HEALTH RISK ASSESSMENT
[Good] : ~his/her~  mood as  good [No] : No [No falls in past year] : Patient reported no falls in the past year [0] : 2) Feeling down, depressed, or hopeless: Not at all (0) [Never] : Never [Patient reported PAP Smear was normal] : Patient reported PAP Smear was normal [Patient reported colonoscopy was normal] : Patient reported colonoscopy was normal [HIV test declined] : HIV test declined [Hepatitis C test declined] : Hepatitis C test declined [With Family] : lives with family [Employed] : employed [College] : College [] :  [# Of Children ___] : has [unfilled] children [Feels Safe at Home] : Feels safe at home [Fully functional (bathing, dressing, toileting, transferring, walking, feeding)] : Fully functional (bathing, dressing, toileting, transferring, walking, feeding) [Fully functional (using the telephone, shopping, preparing meals, housekeeping, doing laundry, using] : Fully functional and needs no help or supervision to perform IADLs (using the telephone, shopping, preparing meals, housekeeping, doing laundry, using transportation, managing medications and managing finances) [Reports changes in hearing] : Reports changes in hearing [Reports changes in vision] : Reports changes in vision [Smoke Detector] : smoke detector [Carbon Monoxide Detector] : carbon monoxide detector [Safety elements used in home] : safety elements used in home [Seat Belt] :  uses seat belt [Sunscreen] : uses sunscreen [Reviewed no changes] : Reviewed, no changes [Name: ___] : Health Care Proxy's Name: [unfilled]  [Relationship: ___] : Relationship: [unfilled] [I will adhere to the patient's wishes.] : I will adhere to the patient's wishes. [None] : Patient does not have any barriers to medication adherence [Yes] : Reviewed medication list for presence of high-risk medications. [NO] : No [FreeTextEntry1] :  right toes hematoma bruising arms [de-identified] : CTX, Cardiology  [Change in mental status noted] : No change in mental status noted [Sexually Active] : not sexually active [Reports changes in dental health] : Reports no changes in dental health [MammogramDate] : 7/13/2025 [PapSmearDate] : 9/9/2013 [BoneDensityDate] : 7/1/2020 [ColonoscopyDate] : 7/19/2024 [de-identified] :  [de-identified] : MYESHA [de-identified] : wears glasses